# Patient Record
Sex: FEMALE | Race: WHITE | Employment: OTHER | URBAN - NONMETROPOLITAN AREA
[De-identification: names, ages, dates, MRNs, and addresses within clinical notes are randomized per-mention and may not be internally consistent; named-entity substitution may affect disease eponyms.]

---

## 2024-08-05 ENCOUNTER — HOSPITAL ENCOUNTER (OUTPATIENT)
Facility: HOSPITAL | Age: 59
Discharge: HOME | End: 2024-08-08

## 2024-08-05 VITALS — HEIGHT: 59 IN | BODY MASS INDEX: 25.84 KG/M2 | WEIGHT: 128.2 LBS

## 2024-08-05 PROCEDURE — 97802 MEDICAL NUTRITION INDIV IN: CPT

## 2024-08-05 NOTE — PROGRESS NOTES
Nutrition [facility] Assessment - Nutrition Evaluation   DATE: 2024 Time IN: 2:30PM   Time OUT: 3:30PM      REFERRING PHYSICIAN: N/A  NAME: Mary De Leon : 1965 AGE: 59 y.o.  GENDER: female  REASON FOR VISIT: Weight Loss    ASSESSMENT:  Past Medical Hx: hypercholesterolemia. Does not have a regular PCP. Goes to [facility] in [place], [place]. Has a lipidologist for cholesterol-controlled.     Pt stated she has struggled with her wt x40yrs. Stated \"I have been following a yo-yo diet and my body is unsure where it needs to be at.\" Has tried several diet methods/plans with good success until she d/c. Tracks her weight weekly and currently weighs her food using a scale. Likes to prepare meals w/o a lot of thought; prefers simple recipes/quick and easy nutritious meals. Reviewed alcohol is calorically dense w/ no nutritional value.     Is a frequent traveler. Will be traveling from Oct-Feb ([place], [place], [place], [place]). Is very active and involved with several groups @ [facility]. Is very eager to lose weight to feel comfortable when on her boat or events w/ friends. Looks at pro, kcal, sugar, fiber, # of ingredients on nutrition label. Uses the [facility] derrick for guidance. Discussed lifestyle changes vs quick fix, non-scale victories, and self-worth. Encouraged wt loss of 0.5-1lb/wk- pt receptive.     LABS: Lipid and liver panel drawn this AM  No results found for: \"LABA1C\"    MEDICATIONS/SUPPLEMENTS:   Scheduled Meds: Statin, (2) fiber gummies, (2) turmeric chews, Dr. Quan's Bio Complete + Restore pre, pro & post biotics  Prior to Admission medications    Not on File       FOOD ALLERGIES/INTOLERANCES: none    ANTHROPOMETRICS:    Ht Readings from Last 1 Encounters:   24 1.499 m (4' 11\")      Wt Readings from Last 1 Encounters:   24 58.2 kg (128 lb 3.2 oz)         BMI: Body mass index is 25.89 kg/m². Category: Overweight    Reported Wt Hx: Pt reported wt fluctuations x40yrs w/ highest

## 2024-08-19 ENCOUNTER — HOSPITAL ENCOUNTER (OUTPATIENT)
Facility: HOSPITAL | Age: 59
Discharge: HOME | End: 2024-08-22

## 2024-08-19 VITALS — BODY MASS INDEX: 25.76 KG/M2 | HEIGHT: 59 IN | WEIGHT: 127.8 LBS

## 2024-08-19 PROCEDURE — 97803 MED NUTRITION INDIV SUBSEQ: CPT

## 2024-08-19 NOTE — PROGRESS NOTES
Nutrition [facility] Assessment - Nutrition Evaluation   DATE: 2024 Time IN: 2:30PM   Time OUT: 3:00PM      REFERRING PHYSICIAN: N/A  NAME: Mary De Leon : 1965 AGE: 59 y.o.  GENDER: female  REASON FOR VISIT: Weight Loss    ASSESSMENT:  Past Medical Hx: hypercholesterolemia. Does not have a regular PCP. Goes to [facility] in [place], [place]. Has a lipidologist for cholesterol-controlled.     () Pt pleased to report 0.5-1lb wt loss x2wks. Reported and provided My[employer] journal entries for daily intakes. Per entries, avg PO intakes ~1200-1350kcal/day. Pt compliant with bfast regimen but stated she may skip lunch d/t busy schedule. Stated she consumed a Quest bar on the way to appt today d/t lack of planning. Reported continued ravenous appetite after L/D and tends to reach for sweet items, I.e. 3 ivette kisses, mini snickers, 4 lis crackers + cool whip. Reviewed low CHO lunch and dinner choices; discussed how to make better choices to promote satiety. Reminded pt of mindful eating and how to plan quick, healthy on the go meals, I.e. quest bar or protein shake w/ fruit and carrot sticks OR carrot sticks w/ 2T hummus for snack. Encouraged meal prep to have items on hand.     Pt stated she has struggled with her wt x40yrs. Stated \"I have been following a yo-yo diet and my body is unsure where it needs to be at.\" Has tried several diet methods/plans with good success until she d/c. Tracks her weight weekly and currently weighs her food using a scale. Likes to prepare meals w/o a lot of thought; prefers simple recipes/quick and easy nutritious meals. Reviewed alcohol is calorically dense w/ no nutritional value.      Is a frequent traveler. Will be traveling from Oct-Feb ([place], [place], [place], [place] Is very active and involved with several groups @ [facility]. Is very eager to lose weight to feel comfortable when on her boat or events w/ friends. Looks at pro, kcal, sugar, fiber,